# Patient Record
Sex: FEMALE | Race: BLACK OR AFRICAN AMERICAN | NOT HISPANIC OR LATINO | URBAN - METROPOLITAN AREA
[De-identification: names, ages, dates, MRNs, and addresses within clinical notes are randomized per-mention and may not be internally consistent; named-entity substitution may affect disease eponyms.]

---

## 2023-08-31 ENCOUNTER — HOSPITAL ENCOUNTER (OUTPATIENT)
Dept: DATA CONVERSION | Facility: HOSPITAL | Age: 53
Discharge: HOME | End: 2023-08-31
Payer: COMMERCIAL

## 2023-08-31 DIAGNOSIS — E78.5 HYPERLIPIDEMIA, UNSPECIFIED: ICD-10-CM

## 2023-08-31 DIAGNOSIS — Z13.228 ENCOUNTER FOR SCREENING FOR OTHER METABOLIC DISORDERS: ICD-10-CM

## 2023-08-31 DIAGNOSIS — Z00.00 ENCOUNTER FOR GENERAL ADULT MEDICAL EXAMINATION WITHOUT ABNORMAL FINDINGS: ICD-10-CM

## 2023-08-31 DIAGNOSIS — Z13.0 ENCOUNTER FOR SCREENING FOR DISEASES OF THE BLOOD AND BLOOD-FORMING ORGANS AND CERTAIN DISORDERS INVOLVING THE IMMUNE MECHANISM: ICD-10-CM

## 2023-08-31 LAB
ALBUMIN SERPL-MCNC: 4.1 GM/DL (ref 3.5–5)
ALBUMIN/GLOB SERPL: 1.3 RATIO (ref 1.5–3)
ALP BLD-CCNC: 90 U/L (ref 35–125)
ALT SERPL-CCNC: 14 U/L (ref 5–40)
ANION GAP SERPL CALCULATED.3IONS-SCNC: 11 MMOL/L (ref 0–19)
APPEARANCE PLAS: CLEAR
AST SERPL-CCNC: 17 U/L (ref 5–40)
BILIRUB SERPL-MCNC: 0.5 MG/DL (ref 0.1–1.2)
BUN SERPL-MCNC: 19 MG/DL (ref 8–25)
BUN/CREAT SERPL: 17.3 RATIO (ref 8–21)
CALCIUM SERPL-MCNC: 9.9 MG/DL (ref 8.5–10.4)
CHLORIDE SERPL-SCNC: 105 MMOL/L (ref 97–107)
CHOLEST SERPL-MCNC: 230 MG/DL (ref 133–200)
CHOLEST/HDLC SERPL: 3.3 RATIO
CO2 SERPL-SCNC: 27 MMOL/L (ref 24–31)
COLOR SPUN FLD: YELLOW
CREAT SERPL-MCNC: 1.1 MG/DL (ref 0.4–1.6)
DEPRECATED RDW RBC AUTO: 39.7 FL (ref 37–54)
ERYTHROCYTE [DISTWIDTH] IN BLOOD BY AUTOMATED COUNT: 14.2 % (ref 11.7–15)
FASTING STATUS PATIENT QL REPORTED: ABNORMAL
GFR SERPL CREATININE-BSD FRML MDRD: 59 ML/MIN/1.73 M2
GLOBULIN SER-MCNC: 3.1 G/DL (ref 1.9–3.7)
GLUCOSE SERPL-MCNC: 86 MG/DL (ref 65–99)
HCT VFR BLD AUTO: 39.6 % (ref 36–44)
HDLC SERPL-MCNC: 70 MG/DL
HGB BLD-MCNC: 13.9 GM/DL (ref 12–15)
LDLC SERPL CALC-MCNC: 148 MG/DL (ref 65–130)
MCH RBC QN AUTO: 27.1 PG (ref 26–34)
MCHC RBC AUTO-ENTMCNC: 35.1 % (ref 31–37)
MCV RBC AUTO: 77.2 FL (ref 80–100)
NRBC BLD-RTO: 0 /100 WBC
PLATELET # BLD AUTO: 208 K/UL (ref 150–450)
PMV BLD AUTO: 11.8 CU (ref 7–12.6)
POTASSIUM SERPL-SCNC: 4.4 MMOL/L (ref 3.4–5.1)
PROT SERPL-MCNC: 7.2 G/DL (ref 5.9–7.9)
RBC # BLD AUTO: 5.13 M/UL (ref 4–4.9)
SODIUM SERPL-SCNC: 143 MMOL/L (ref 133–145)
TRIGL SERPL-MCNC: 62 MG/DL (ref 40–150)
TSH SERPL DL<=0.05 MIU/L-ACNC: 0.94 MIU/L (ref 0.27–4.2)
WBC # BLD AUTO: 5.8 K/UL (ref 4.5–11)

## 2023-09-06 ENCOUNTER — HOSPITAL ENCOUNTER (OUTPATIENT)
Dept: DATA CONVERSION | Facility: HOSPITAL | Age: 53
End: 2023-09-06

## 2023-09-06 DIAGNOSIS — Z13.6 ENCOUNTER FOR SCREENING FOR CARDIOVASCULAR DISORDERS: ICD-10-CM

## 2023-09-06 DIAGNOSIS — E78.5 HYPERLIPIDEMIA, UNSPECIFIED: ICD-10-CM

## 2023-09-16 VITALS
HEART RATE: 79 BPM | HEIGHT: 62 IN | DIASTOLIC BLOOD PRESSURE: 80 MMHG | SYSTOLIC BLOOD PRESSURE: 120 MMHG | BODY MASS INDEX: 32.72 KG/M2 | TEMPERATURE: 96.4 F | OXYGEN SATURATION: 98 % | WEIGHT: 177.8 LBS

## 2023-10-01 ENCOUNTER — HOSPITAL ENCOUNTER (OUTPATIENT)
Dept: RADIOLOGY | Facility: HOSPITAL | Age: 53
Discharge: HOME | End: 2023-10-01
Payer: COMMERCIAL

## 2023-10-01 DIAGNOSIS — Z13.6 ENCOUNTER FOR SCREENING FOR CARDIOVASCULAR DISORDERS: ICD-10-CM

## 2023-10-01 DIAGNOSIS — E78.5 HYPERLIPIDEMIA, UNSPECIFIED: ICD-10-CM

## 2023-10-01 PROCEDURE — 75571 CT HRT W/O DYE W/CA TEST: CPT

## 2023-10-02 ENCOUNTER — TELEPHONE (OUTPATIENT)
Dept: PRIMARY CARE | Facility: CLINIC | Age: 53
End: 2023-10-02
Payer: COMMERCIAL

## 2023-10-02 DIAGNOSIS — R91.1 LUNG NODULE: Primary | ICD-10-CM

## 2023-10-02 NOTE — TELEPHONE ENCOUNTER
Pt called in for results of her CT scoring, states that she got them In her email and want to know what they mean. Please advise

## 2023-10-03 NOTE — TELEPHONE ENCOUNTER
CT calcium scoring results received and reviewed.  Overall, patient's calcium scoring test was excellent with no significant calcifications or atherosclerotic changes appreciated in her coronary vessels.  Radiology did note a incidental lung nodule which they are recommending she have a repeat chest CT in 6 to 12 months to monitor for any evidence of changes.  We will place future order to have this coordinated.

## 2023-12-12 ENCOUNTER — TELEMEDICINE (OUTPATIENT)
Dept: PRIMARY CARE | Facility: CLINIC | Age: 53
End: 2023-12-12
Payer: COMMERCIAL

## 2023-12-12 DIAGNOSIS — R11.0 NAUSEA: ICD-10-CM

## 2023-12-12 DIAGNOSIS — B34.9 ACUTE VIRAL SYNDROME: Primary | ICD-10-CM

## 2023-12-12 PROBLEM — E78.5 HYPERLIPIDEMIA: Status: ACTIVE | Noted: 2023-12-12

## 2023-12-12 PROCEDURE — 99214 OFFICE O/P EST MOD 30 MIN: CPT | Performed by: FAMILY MEDICINE

## 2023-12-12 RX ORDER — ONDANSETRON 4 MG/1
4 TABLET, FILM COATED ORAL EVERY 8 HOURS PRN
Qty: 21 TABLET | Refills: 0 | Status: SHIPPED | OUTPATIENT
Start: 2023-12-12 | End: 2023-12-19

## 2023-12-12 RX ORDER — ASCORBIC ACID 125 MG
TABLET,CHEWABLE ORAL
COMMUNITY

## 2023-12-12 ASSESSMENT — PAIN SCALES - GENERAL: PAINLEVEL: 7

## 2023-12-12 ASSESSMENT — PATIENT HEALTH QUESTIONNAIRE - PHQ9
SUM OF ALL RESPONSES TO PHQ9 QUESTIONS 1 AND 2: 0
1. LITTLE INTEREST OR PLEASURE IN DOING THINGS: NOT AT ALL
2. FEELING DOWN, DEPRESSED OR HOPELESS: NOT AT ALL

## 2023-12-12 NOTE — PATIENT INSTRUCTIONS
Problem List Items Addressed This Visit             ICD-10-CM    Nausea R11.0    Relevant Medications    ondansetron (Zofran) 4 mg tablet    Acute viral syndrome - Primary B34.9     - Symptoms seem consistent with that of a viral illness likely causing upper respiratory symptoms as well as GI upset  -Will send antinausea medication  -If possible, it is recommended that she take a home COVID-19 test to rule this out  - Recommend supportive care with increased fluid intake to thin secretions, Ibuprofen or Tylenol as needed for pain or fever, and steamy showers/saline nasal rinses to help clear the nasal passages   - You may consider tea with honey or a cinnamon stick as these have natural antiviral and antibiotic properties   - Call if symptoms worsen or do not improve with these treatments         Relevant Medications    ondansetron (Zofran) 4 mg tablet

## 2023-12-12 NOTE — ASSESSMENT & PLAN NOTE
- Symptoms seem consistent with that of a viral illness likely causing upper respiratory symptoms as well as GI upset  -Will send antinausea medication  -If possible, it is recommended that she take a home COVID-19 test to rule this out  - Recommend supportive care with increased fluid intake to thin secretions, Ibuprofen or Tylenol as needed for pain or fever, and steamy showers/saline nasal rinses to help clear the nasal passages   - You may consider tea with honey or a cinnamon stick as these have natural antiviral and antibiotic properties   - Call if symptoms worsen or do not improve with these treatments

## 2023-12-12 NOTE — PROGRESS NOTES
Outpatient Visit Note    Chief Complaint   Patient presents with    Headache     Sx started yesterday. Tried Dayquil and ginger ale with slight relief.    Cough    Diarrhea    Sinus Drainage    GI Upset       With patient's permission, this is a Telemedicine visit with video and audio. The provider and patient participated in this telemedicine encounter.    HPI:  Abigail Warren is a 53 y.o. female who presents to the office via telemedicine encounter secondary to acute congestion with diarrhea and nausea.    She reports 1 to 2 days of persistent and progressive malaise with associated subjective fever/chills, nonproductive cough, nasal/sinus congestion and nausea with diarrhea.  States to been at a social gathering with her family over the weekend to which multiple members are now sick.  States that her sister took a COVID-19 test which was negative.  Has been managing with rest, DayQuil and ginger ale with some relief.  No reported chest pain or difficulty breathing.          Current Medications  Current Outpatient Medications   Medication Instructions    multivit with min-folic acid (Women's Multivitamin Gummies) 120 mcg tablet,chewable as directed Orally    ondansetron (ZOFRAN) 4 mg, oral, Every 8 hours PRN        Allergies  No Known Allergies     History reviewed. No pertinent past medical history.   Past Surgical History:   Procedure Laterality Date    BIOPSY  03/2023     No family history on file.  Social History     Tobacco Use    Smoking status: Never    Smokeless tobacco: Never   Vaping Use    Vaping Use: Never used   Substance Use Topics    Alcohol use: Never    Drug use: Never     Tobacco Use: Low Risk  (12/12/2023)    Patient History     Smoking Tobacco Use: Never     Smokeless Tobacco Use: Never     Passive Exposure: Not on file        ROS  All pertinent positive symptoms are included in the history of present illness.  All other systems have been reviewed and are negative and noncontributory to  this patient's current ailments.    VITAL SIGNS  There were no vitals filed for this visit.   There is no height or weight on file to calculate BMI.   Patient is unable to provide    PHYSICAL EXAM  GENERAL APPEARANCE:  Alert and oriented x 3, Pleasant and cooperative, No acute distress.   LUNGS:  No conversational dyspnea or cough during encounter.   PSYCH:  appropriate mood and affect, no difficulty with speech.   Telemedicine visit, no other exam component done.      Assessment/Plan   Problem List Items Addressed This Visit             ICD-10-CM    Nausea R11.0    Relevant Medications    ondansetron (Zofran) 4 mg tablet    Acute viral syndrome - Primary B34.9     - Symptoms seem consistent with that of a viral illness likely causing upper respiratory symptoms as well as GI upset  -Will send antinausea medication  -If possible, it is recommended that she take a home COVID-19 test to rule this out  - Recommend supportive care with increased fluid intake to thin secretions, Ibuprofen or Tylenol as needed for pain or fever, and steamy showers/saline nasal rinses to help clear the nasal passages   - You may consider tea with honey or a cinnamon stick as these have natural antiviral and antibiotic properties   - Call if symptoms worsen or do not improve with these treatments         Relevant Medications    ondansetron (Zofran) 4 mg tablet

## 2023-12-27 ENCOUNTER — TELEMEDICINE (OUTPATIENT)
Dept: PRIMARY CARE | Facility: CLINIC | Age: 53
End: 2023-12-27
Payer: COMMERCIAL

## 2023-12-27 DIAGNOSIS — K52.9 ACUTE GASTROENTERITIS: Primary | ICD-10-CM

## 2023-12-27 PROCEDURE — 99213 OFFICE O/P EST LOW 20 MIN: CPT | Performed by: NURSE PRACTITIONER

## 2023-12-27 ASSESSMENT — ENCOUNTER SYMPTOMS
DIARRHEA: 1
FEVER: 0
CHILLS: 0

## 2023-12-27 NOTE — PROGRESS NOTES
With patient's permission this is a telemedicine visit with video and audio.  History Of Present Illness  Abigail Warren is a 53 y.o. female who calls in for Diarrhea ([phone] Dr. Santana Patient Diarrhea since Monday).    Diarrhea   The current episode started in the past 7 days. The problem occurs 2 to 4 times per day. The problem has been unchanged. The stool consistency is described as Watery. The patient states that diarrhea awakens her from sleep. Pertinent negatives include no chills or fever. Nothing aggravates the symptoms. There are no known risk factors. She has tried nothing for the symptoms. The treatment provided no relief.     Drinking a lot of water, ate a little bit last night and then had diarrhea.        Past Medical History  She has no past medical history on file.    Medications  Current Outpatient Medications   Medication Instructions    multivit with min-folic acid (Women's Multivitamin Gummies) 120 mcg tablet,chewable as directed Orally        Surgical History  She has a past surgical history that includes biopsy (03/2023).     Social History  She reports that she has never smoked. She has never used smokeless tobacco. She reports that she does not drink alcohol and does not use drugs.    Family History  No family history on file.     Allergies  Patient has no known allergies.    On video:     Appearance: Normal appearance.      Effort: Respiratory effort is normal. Speaking in full sentences. No respiratory distress.     Mood and Affect: Mood normal.         Thought Content: Thought content normal.         Judgment: Judgment normal.      Last Recorded Vitals  There were no vitals taken for this visit.  (Vitals are taken by patient at home, if any reported)    Relevant Results      Assessment/Plan   Abigail was seen today for diarrhea.  Diagnoses and all orders for this visit:  Acute gastroenteritis (Primary)          Elizabeth Light, APRN-CNP   
Female

## 2024-09-05 ASSESSMENT — PROMIS GLOBAL HEALTH SCALE
RATE_GENERAL_HEALTH: VERY GOOD
RATE_AVERAGE_PAIN: 0
RATE_SOCIAL_SATISFACTION: VERY GOOD
RATE_QUALITY_OF_LIFE: VERY GOOD
CARRYOUT_PHYSICAL_ACTIVITIES: COMPLETELY
RATE_MENTAL_HEALTH: VERY GOOD
RATE_PHYSICAL_HEALTH: VERY GOOD
CARRYOUT_SOCIAL_ACTIVITIES: VERY GOOD
EMOTIONAL_PROBLEMS: NEVER

## 2024-09-09 ENCOUNTER — OFFICE VISIT (OUTPATIENT)
Dept: PRIMARY CARE | Facility: CLINIC | Age: 54
End: 2024-09-09
Payer: COMMERCIAL

## 2024-09-09 VITALS
OXYGEN SATURATION: 97 % | DIASTOLIC BLOOD PRESSURE: 78 MMHG | TEMPERATURE: 96.7 F | SYSTOLIC BLOOD PRESSURE: 122 MMHG | WEIGHT: 180 LBS | HEIGHT: 62 IN | HEART RATE: 70 BPM | BODY MASS INDEX: 33.13 KG/M2

## 2024-09-09 DIAGNOSIS — Z12.31 BREAST CANCER SCREENING BY MAMMOGRAM: ICD-10-CM

## 2024-09-09 DIAGNOSIS — E78.5 HYPERLIPIDEMIA, UNSPECIFIED HYPERLIPIDEMIA TYPE: ICD-10-CM

## 2024-09-09 DIAGNOSIS — Z12.11 COLON CANCER SCREENING: ICD-10-CM

## 2024-09-09 DIAGNOSIS — Z00.00 ROUTINE ADULT HEALTH MAINTENANCE: Primary | ICD-10-CM

## 2024-09-09 DIAGNOSIS — Z13.0 SCREENING FOR DEFICIENCY ANEMIA: ICD-10-CM

## 2024-09-09 DIAGNOSIS — Z11.59 NEED FOR HEPATITIS C SCREENING TEST: ICD-10-CM

## 2024-09-09 PROCEDURE — 99396 PREV VISIT EST AGE 40-64: CPT | Performed by: FAMILY MEDICINE

## 2024-09-09 PROCEDURE — 3008F BODY MASS INDEX DOCD: CPT | Performed by: FAMILY MEDICINE

## 2024-09-09 PROCEDURE — 1036F TOBACCO NON-USER: CPT | Performed by: FAMILY MEDICINE

## 2024-09-09 ASSESSMENT — PATIENT HEALTH QUESTIONNAIRE - PHQ9
1. LITTLE INTEREST OR PLEASURE IN DOING THINGS: NOT AT ALL
2. FEELING DOWN, DEPRESSED OR HOPELESS: NOT AT ALL
SUM OF ALL RESPONSES TO PHQ9 QUESTIONS 1 AND 2: 0

## 2024-09-09 ASSESSMENT — PAIN SCALES - GENERAL: PAINLEVEL: 0-NO PAIN

## 2024-09-09 NOTE — PROGRESS NOTES
Outpatient Visit Note    Chief Complaint   Patient presents with    Annual Exam       HPI:  Abigail Warren is a 54 y.o. female who presents to the office for annual well exam.  She was last seen for annual well exam on 8/31/2023 with interval telemedicine visit for acute illness.    Last panel blood work completed on 8/31/2023 including CBC, CMP, lipid panel and TSH.  Blood work was remarkable for persistent hypercholesterolemia with minimal change despite prominent lifestyle modification.  Patient was encouraged to continue with healthy, low-fat diet    Well exam:  Overall, she describes her health as good with no reports of recent illness or hospitalization.  Stated that her diet had dramatically improved with her participating in weight watchers since February resulting in a 25 pound weight loss. In regards to physical activity, she does attempt to walk at least 3 times week and regularly goes to an aerobics class. She denies any significant sleep complaints. She denies issues of chest pain, shortness of breath, headaches, vision/hearing changes, abdominal pain, vomiting, diarrhea, melena, hematochezia, constipation or urinary symptoms.           Preventative Health Maintenance:  In regards to preventative health maintenance, last Tdap received unknown. Flu shot not typically received. In regards to CRC screening, she has not had a colon cancer screening to date. She was originally set for call endoscopy with Dr. Hebert though this was canceled. She was subsequently given Cologuard kit though this was not completed secondary to extensive cost with her previous insurance. She has since started with a new insurance and does not know if her coverage has changed. Patient has received initial Pfizer COVID-19 vaccine in December 2021 with no subsequent doses listed.           She reports an established relationship with OB/GYN who organizes her routine female health maintenance exams, having last stated to have  annual in February 2023. Did have biopsy and D&C completed at that time with no abnormalities appreciated.  Due for mammogram at this time.     Does have a prior history of right wrist pain to which she had cortisone injections with Orthopedics. Denies any persisting concerns following last injection.    Current Medications  No current outpatient medications       Allergies  No Known Allergies     Immunizations  Immunization History   Administered Date(s) Administered    Pfizer Purple Cap SARS-CoV-2 12/13/2021        History reviewed. No pertinent past medical history.   Past Surgical History:   Procedure Laterality Date    BIOPSY  03/2023     Family History   Problem Relation Name Age of Onset    Stroke Mother Arco     Hypertension Father Earnest      Social History     Tobacco Use    Smoking status: Never    Smokeless tobacco: Never   Vaping Use    Vaping status: Never Used   Substance Use Topics    Alcohol use: Never    Drug use: Never       ROS  All pertinent positive symptoms are included in the history of present illness.  All other systems have been reviewed and are negative and noncontributory to this patient's current ailments.    VITAL SIGNS  Vitals:    09/09/24 1448   BP: 122/78   Pulse: 70   Temp: 35.9 °C (96.7 °F)   SpO2: 97%       PHYSICAL EXAM  GENERAL APPEARANCE: alert and oriented, Pleasant and cooperative, No Acute Distress.   HEENT: EOMI, PERRLA, TMs intact and flat bilaterally, patent nares, normal oropharynx, MMM  NECK: no lymphadenopathy, no thyromegaly.   HEART: RRR, normal S1S2, no murmurs, click or rubs.   LUNGS: clear to auscultation bilaterally, no wheezes/rhonchi/rales.   ABDOMEN: soft, non-tender, no organomegaly, no masses palpated, no guarding or rigidity.   EXTREMITIES: no edema, normal ROM  SKIN: normal, no rash, unremarkable.   NEUROLOGIC EXAM: non-focal exam.   MUSCULOSKELETAL: no gross abnormalities.   PSYCH: affect is normal, eye contact is good.     Assessment/Plan   Problem  List Items Addressed This Visit             ICD-10-CM    Hyperlipidemia E78.5     - Will monitor cholesterol levels with blood work ordered today  - Continue to focus on healthy, low-fat diet         Relevant Orders    TSH with reflex to Free T4 if abnormal    Lipid Panel    Comprehensive Metabolic Panel     Other Visit Diagnoses         Codes    Routine adult health maintenance    -  Primary Z00.00    Relevant Orders    TSH with reflex to Free T4 if abnormal    Lipid Panel    Comprehensive Metabolic Panel    CBC    Hepatitis C antibody    BI mammo bilateral screening tomosynthesis    Cologuard® colon cancer screening    Colon cancer screening     Z12.11    Relevant Orders    Cologuard® colon cancer screening    Breast cancer screening by mammogram     Z12.31    Relevant Orders    BI mammo bilateral screening tomosynthesis    Screening for deficiency anemia     Z13.0    Relevant Orders    CBC    Need for hepatitis C screening test     Z11.59    Relevant Orders    Hepatitis C antibody            Additional Visit Plans:  - Complete history and physical examination was performed    GENERAL RECOMMENDATIONS:  - Complete review of history of physical exam completed today  - A healthy diet to maintain a normal BMI (under 25) to reduce heart disease, risk for diabetes encouraged.  - Exercising 150 minutes per week and eating healthy to reduce heart disease.  - Blood pressure screen completed.    BLOOD TESTING:  - Orders for fasting routine blood work given today, to be completed at your earliest convenience  - Will contact you with the blood work results once received and reviewed.    General Recommendations include:  - Cholesterol and diabetes screen if risk factors (overweight, high blood pressure).  - Sexually transmitted infections if risk factors.    VACCINATIONS RECOMMENDATIONS:  - Flu shot annually - advocated seasonally  - Tetanus booster every 10 years - advocated  - Pneumonia vaccination starting at 65 years old  (or earlier if risk factors - smoker, diabetic, heart or lung conditions) -not due yet  - Shingles vaccine for those 50 years or older - check with your insurance for SHINGRIX coverage and get it at your local pharmacy -advocated  -COVID-19 vaccine advocated    SCREENINGS RECOMMENDATIONS:  -Colon cancer screening (with colonoscopy or Cologuard) for men and women starting at age 45 until 74 years old - discussed and advocated    (female)  - Cervical cancer screening (pap test) in women starting at age 21 until age 65 years old -advocated to be completed with female   - Mammogram screening for breast cancer in women starting at 40-50 years and every 1-2 years until age 74 - advocated  - Bone density screening (DEXA) for osteoporosis in women aged 65 years and older (in younger women who are higher risk) - not due yet    Counseling:       Medication education:         Education:  The patient is counseled regarding potential side-effects of all new medications        Understanding:  Patient expressed understanding        Adherence:  No barriers to adherence identified      ** Please excuse any errors in grammar or translation related to this dictation. Voice recognition software was utilized to prepare this document. **

## 2024-09-09 NOTE — LETTER
September 9, 2024     Patient: Abigail Warren   YOB: 1970   Date of Visit: 9/9/2024       To Whom It May Concern:    Abigail Warren was seen in my clinic on 9/9/2024 for her annual well exam.      If you have any questions or concerns, please don't hesitate to call.         Sincerely,         Harry Alva, DO

## 2024-09-09 NOTE — PATIENT INSTRUCTIONS
Problem List Items Addressed This Visit             ICD-10-CM    Hyperlipidemia E78.5     - Will monitor cholesterol levels with blood work ordered today  - Continue to focus on healthy, low-fat diet         Relevant Orders    TSH with reflex to Free T4 if abnormal    Lipid Panel    Comprehensive Metabolic Panel     Other Visit Diagnoses         Codes    Routine adult health maintenance    -  Primary Z00.00    Relevant Orders    TSH with reflex to Free T4 if abnormal    Lipid Panel    Comprehensive Metabolic Panel    CBC    Hepatitis C antibody    BI mammo bilateral screening tomosynthesis    Cologuard® colon cancer screening    Colon cancer screening     Z12.11    Relevant Orders    Cologuard® colon cancer screening    Breast cancer screening by mammogram     Z12.31    Relevant Orders    BI mammo bilateral screening tomosynthesis    Screening for deficiency anemia     Z13.0    Relevant Orders    CBC    Need for hepatitis C screening test     Z11.59    Relevant Orders    Hepatitis C antibody            Additional Visit Plans:  - Complete history and physical examination was performed    GENERAL RECOMMENDATIONS:  - Complete review of history of physical exam completed today  - A healthy diet to maintain a normal BMI (under 25) to reduce heart disease, risk for diabetes encouraged.  - Exercising 150 minutes per week and eating healthy to reduce heart disease.  - Blood pressure screen completed.    BLOOD TESTING:  - Orders for fasting routine blood work given today, to be completed at your earliest convenience  - Will contact you with the blood work results once received and reviewed.    General Recommendations include:  - Cholesterol and diabetes screen if risk factors (overweight, high blood pressure).  - Sexually transmitted infections if risk factors.    VACCINATIONS RECOMMENDATIONS:  - Flu shot annually - advocated seasonally  - Tetanus booster every 10 years - advocated  - Pneumonia vaccination starting at 65  years old (or earlier if risk factors - smoker, diabetic, heart or lung conditions) -not due yet  - Shingles vaccine for those 50 years or older - check with your insurance for SHINGRIX coverage and get it at your local pharmacy -advocated  -COVID-19 vaccine advocated    SCREENINGS RECOMMENDATIONS:  -Colon cancer screening (with colonoscopy or Cologuard) for men and women starting at age 45 until 74 years old - discussed and advocated    (female)  - Cervical cancer screening (pap test) in women starting at age 21 until age 65 years old -advocated to be completed with female   - Mammogram screening for breast cancer in women starting at 40-50 years and every 1-2 years until age 74 - advocated  - Bone density screening (DEXA) for osteoporosis in women aged 65 years and older (in younger women who are higher risk) - not due yet    Counseling:       Medication education:         Education:  The patient is counseled regarding potential side-effects of all new medications        Understanding:  Patient expressed understanding        Adherence:  No barriers to adherence identified      ** Please excuse any errors in grammar or translation related to this dictation. Voice recognition software was utilized to prepare this document. **

## 2024-09-09 NOTE — ASSESSMENT & PLAN NOTE
- Will monitor cholesterol levels with blood work ordered today  - Continue to focus on healthy, low-fat diet

## 2024-10-02 ENCOUNTER — HOSPITAL ENCOUNTER (OUTPATIENT)
Dept: RADIOLOGY | Facility: HOSPITAL | Age: 54
Discharge: HOME | End: 2024-10-02
Payer: COMMERCIAL

## 2024-10-02 VITALS — WEIGHT: 170 LBS | HEIGHT: 61 IN | BODY MASS INDEX: 32.1 KG/M2

## 2024-10-02 DIAGNOSIS — Z00.00 ROUTINE ADULT HEALTH MAINTENANCE: ICD-10-CM

## 2024-10-02 DIAGNOSIS — Z12.31 BREAST CANCER SCREENING BY MAMMOGRAM: ICD-10-CM

## 2024-10-02 DIAGNOSIS — R91.1 LUNG NODULE: ICD-10-CM

## 2024-10-02 PROCEDURE — 71250 CT THORAX DX C-: CPT

## 2024-10-02 PROCEDURE — 77063 BREAST TOMOSYNTHESIS BI: CPT | Performed by: RADIOLOGY

## 2024-10-02 PROCEDURE — 77067 SCR MAMMO BI INCL CAD: CPT

## 2024-10-02 PROCEDURE — 77067 SCR MAMMO BI INCL CAD: CPT | Performed by: RADIOLOGY

## 2024-10-04 ENCOUNTER — TELEPHONE (OUTPATIENT)
Dept: PRIMARY CARE | Facility: CLINIC | Age: 54
End: 2024-10-04
Payer: COMMERCIAL

## 2024-10-04 DIAGNOSIS — E04.1 THYROID NODULE: Primary | ICD-10-CM

## 2024-10-04 NOTE — TELEPHONE ENCOUNTER
Review of chart pt had not done lab work but did have imaging done and an order for an US thyroid was placed. Please advise about thyroid

## 2024-10-04 NOTE — TELEPHONE ENCOUNTER
Stated she can see her lab results in her mychart. I did not see them listed in her labs as of yet. She mentioned that they were done there LW. Please advise and call back as she has concerns over the Thyroid.

## 2024-10-07 ENCOUNTER — E-VISIT (OUTPATIENT)
Dept: PRIMARY CARE | Facility: CLINIC | Age: 54
End: 2024-10-07
Payer: COMMERCIAL

## 2024-10-07 NOTE — TELEPHONE ENCOUNTER
[facility] to review her test results. She mentioned that she is in conference calls all day today. Please call back.

## 2024-10-08 ENCOUNTER — HOSPITAL ENCOUNTER (OUTPATIENT)
Dept: RADIOLOGY | Facility: HOSPITAL | Age: 54
Discharge: HOME | End: 2024-10-08
Payer: COMMERCIAL

## 2024-10-08 ENCOUNTER — LAB (OUTPATIENT)
Dept: LAB | Facility: LAB | Age: 54
End: 2024-10-08
Payer: COMMERCIAL

## 2024-10-08 DIAGNOSIS — Z11.59 NEED FOR HEPATITIS C SCREENING TEST: ICD-10-CM

## 2024-10-08 DIAGNOSIS — Z13.0 SCREENING FOR DEFICIENCY ANEMIA: ICD-10-CM

## 2024-10-08 DIAGNOSIS — E78.5 HYPERLIPIDEMIA, UNSPECIFIED HYPERLIPIDEMIA TYPE: ICD-10-CM

## 2024-10-08 DIAGNOSIS — Z00.00 ROUTINE ADULT HEALTH MAINTENANCE: ICD-10-CM

## 2024-10-08 DIAGNOSIS — E04.1 THYROID NODULE: ICD-10-CM

## 2024-10-08 LAB
ALBUMIN SERPL BCP-MCNC: 4.4 G/DL (ref 3.4–5)
ALP SERPL-CCNC: 70 U/L (ref 33–110)
ALT SERPL W P-5'-P-CCNC: 21 U/L (ref 7–45)
ANION GAP SERPL CALCULATED.3IONS-SCNC: 11 MMOL/L (ref 10–20)
AST SERPL W P-5'-P-CCNC: 31 U/L (ref 9–39)
BILIRUB SERPL-MCNC: 0.8 MG/DL (ref 0–1.2)
BUN SERPL-MCNC: 20 MG/DL (ref 6–23)
CALCIUM SERPL-MCNC: 9.7 MG/DL (ref 8.6–10.3)
CHLORIDE SERPL-SCNC: 104 MMOL/L (ref 98–107)
CHOLEST SERPL-MCNC: 227 MG/DL (ref 0–199)
CHOLEST/HDLC SERPL: 3.2 {RATIO}
CO2 SERPL-SCNC: 28 MMOL/L (ref 21–32)
CREAT SERPL-MCNC: 1.05 MG/DL (ref 0.5–1.05)
EGFRCR SERPLBLD CKD-EPI 2021: 62 ML/MIN/1.73M*2
ERYTHROCYTE [DISTWIDTH] IN BLOOD BY AUTOMATED COUNT: 13.7 % (ref 11.5–14.5)
GLUCOSE SERPL-MCNC: 99 MG/DL (ref 74–99)
HCT VFR BLD AUTO: 39 % (ref 36–46)
HCV AB SER QL: NONREACTIVE
HDLC SERPL-MCNC: 70.9 MG/DL
HGB BLD-MCNC: 14.1 G/DL (ref 12–16)
LDLC SERPL CALC-MCNC: 140 MG/DL
MCH RBC QN AUTO: 27.1 PG (ref 26–34)
MCHC RBC AUTO-ENTMCNC: 36.2 G/DL (ref 32–36)
MCV RBC AUTO: 75 FL (ref 80–100)
NON HDL CHOLESTEROL: 156 MG/DL (ref 0–149)
NRBC BLD-RTO: 0 /100 WBCS (ref 0–0)
PLATELET # BLD AUTO: 201 X10*3/UL (ref 150–450)
POTASSIUM SERPL-SCNC: 4.1 MMOL/L (ref 3.5–5.3)
PROT SERPL-MCNC: 7.4 G/DL (ref 6.4–8.2)
RBC # BLD AUTO: 5.21 X10*6/UL (ref 4–5.2)
SODIUM SERPL-SCNC: 139 MMOL/L (ref 136–145)
TRIGL SERPL-MCNC: 83 MG/DL (ref 0–149)
TSH SERPL-ACNC: 1.57 MIU/L (ref 0.44–3.98)
VLDL: 17 MG/DL (ref 0–40)
WBC # BLD AUTO: 5.5 X10*3/UL (ref 4.4–11.3)

## 2024-10-08 PROCEDURE — 84443 ASSAY THYROID STIM HORMONE: CPT

## 2024-10-08 PROCEDURE — 80053 COMPREHEN METABOLIC PANEL: CPT

## 2024-10-08 PROCEDURE — 76536 US EXAM OF HEAD AND NECK: CPT | Performed by: STUDENT IN AN ORGANIZED HEALTH CARE EDUCATION/TRAINING PROGRAM

## 2024-10-08 PROCEDURE — 86803 HEPATITIS C AB TEST: CPT

## 2024-10-08 PROCEDURE — 36415 COLL VENOUS BLD VENIPUNCTURE: CPT

## 2024-10-08 PROCEDURE — 80061 LIPID PANEL: CPT

## 2024-10-08 PROCEDURE — 85027 COMPLETE CBC AUTOMATED: CPT

## 2024-10-08 PROCEDURE — 76536 US EXAM OF HEAD AND NECK: CPT

## 2024-10-09 DIAGNOSIS — E04.2 MULTIPLE THYROID NODULES: Primary | ICD-10-CM

## 2024-10-14 ENCOUNTER — APPOINTMENT (OUTPATIENT)
Dept: OTOLARYNGOLOGY | Facility: CLINIC | Age: 54
End: 2024-10-14
Payer: COMMERCIAL

## 2024-10-14 VITALS — WEIGHT: 172 LBS | BODY MASS INDEX: 32.5 KG/M2

## 2024-10-14 DIAGNOSIS — E04.2 MULTIPLE THYROID NODULES: ICD-10-CM

## 2024-10-14 PROCEDURE — 1036F TOBACCO NON-USER: CPT | Performed by: GENERAL PRACTICE

## 2024-10-14 PROCEDURE — 99203 OFFICE O/P NEW LOW 30 MIN: CPT | Performed by: GENERAL PRACTICE

## 2024-10-14 RX ORDER — BISMUTH SUBSALICYLATE 262 MG
1 TABLET,CHEWABLE ORAL DAILY
COMMUNITY

## 2024-10-14 NOTE — PROGRESS NOTES
Otolaryngology - Head and Neck Surgery Outpatient New Patient Visit Note        Assessment/Plan:   Problem List Items Addressed This Visit    None  Visit Diagnoses         Codes    Multiple thyroid nodules     E04.2            54yoF with multinodular goiter.   FNA pending this week of dominant nodule.          Follow up:  -plan for follow up in clinic as needed and after completion of ordered studies    All of the above findings, impressions, treatment planning and follow up plans were discussed with the patient who indicated understanding.  the patient was instructed to contact or return to clinic sooner if symptoms/signs persist or worsen despite the above management.      Roberto Heredia MD  Otolaryngology - Head and Neck Surgery            History Of Present Illness  Abigail Warren is a 54 y.o. female presenting for evaluation of thyroid nodules.     Incidentally noted on recent CT chest  No compressive symptoms  No symptoms of hyper/hypothyroid and is chemically euthyroid  Some family history of thyroid issues, but no cancer.  No compressive symptoms.     FNA scheduled for Wednesday of dominant nodule    Left 2.8cm TR3 nodule  Right 1.8cm TR3 nodule.            Past Medical History  She has no past medical history on file.    Surgical History  She has a past surgical history that includes biopsy (03/2023).     Social History  She reports that she has never smoked. She has never used smokeless tobacco. She reports that she does not drink alcohol and does not use drugs.    Family History  Family History   Problem Relation Name Age of Onset    Stroke Mother Nasreen     Hypertension Father Earnest         Allergies  Patient has no known allergies.    Review of Systems  ROS: Pertinent positives as noted in HPI.    - CONSTITUTIONAL: Does not report weight loss, fever or chills.    - HEENT:   Ear: Does not report tinnitus, vertigo, hearing loss, otalgia, otorrhea  Nose: Does not report congestion, rhinorrhea, epistaxis,  decreased smell  Throat: Does not report pain, dysphagia, odynophagia  Larynx: Does not report hoarseness,  difficulty breathing, pain with speaking (odynophonia)  Neck: Does not report new masses, pain, swelling  Face: Does not report sinus pain, pressure, swelling, numbness, weakness     - RESPIRATORY: Does not report SOB or cough.    - CV: Does not report palpitations or chest pain.     - GI: Does not report abdominal pain, nausea, vomiting or diarrhea.    - : Does not report dysuria or urinary frequency.    - MSK: Does not report myalgia or joint pain.    - SKIN: Does not report rash or pruritus.    - NEUROLOGICAL: Does not report headache or syncope.    - PSYCHIATRIC: Does not report recent changes in mood. Does not report anxiety or depression.         Physical Exam:     GENERAL:   Alert & Oriented to person, place and time; Normal affect and appearance. Well developed and well nourished. Conversant & cooperative with examination.     HEAD:   Normocephalic, atraumatic. No sinus tenderness to palpation. Normal parotid bilaterally. Normal facial strength.     NEUROLOGIC:   Cranial nerves II-XII grossly intact, gait WNL. Normal mood and affect.    EYES:   Extraocular movements intact. Pupils equal, round, reactive to light and accommodation. No nystagmus, no ptosis. no scleral injection.    EAR:   Normal auricle. No discomfort or TTP with manipulation.   Handheld otoscopic exam showed normal external auditory canals bilaterally. No purulence or EAC inflammation. Minimal cerumen.   Right tympanic membrane clear and mobile without evidence of perforation, retraction or middle ear effusion.   Left tympanic membrane clear and mobile without evidence of perforation, retraction or middle ear effusion.     NOSE:   No external deformity. No external nasal lesions, lacerations, or scars. Nasal tip symmetrical with normal nasal valves.   Nasal cavity with essentially midline septum, normal mucosa and turbinates. No  lesions, masses, purulence or polyps.     OC/OP:   Mucous membranes moist, no masses, lesions or exudates.   Normal tongue, floor of mouth, teeth, gums, lips. Normal posterior pharyngeal wall.    Normal tonsils without erythema, exudate or obvious calculi     NECK:   No neck masses or thyroid enlargement. Trachea midline. No tenderness to palpation    LYMPHATIC:   No cervical lymphadenopathy.     RESPIRATORY:   Symmetric chest elevation & no retractions. No significant hoarseness. No increased work of breathing.    CV:   No clubbing or cyanosis. No obvious edema    Skin:   No facial rashes, vesicles or lesions.     Extremities:   No gross abnormalities      Clinic Procedure        Information review:  External sources (notes, imaging, lab results) listed below personally reviewed to aid in medical decision making process.  - US thyroid 10/8/24  - CT chest 10/2/24  -

## 2024-10-15 NOTE — PROGRESS NOTES
Subjective   Abigail Warren is a 54 y.o. female is here for evaluation and management of multinodular goitre. The nodule was noted incidentally on CT chest done on 10/3/24 for follow  up of lung nodule. Patient denies any symptoms from the thyroid nodule.  Thyroid ultrasound was done on 10/4/2024 which was suggestive of left thyroid nodule measuring 2.8 cm, TI-RADS 3, right thyroid nodule measuring 1.8 cm, TI-RADS 3.  Patient saw ENT yesterday.  Patient denies change in size of nodule, hoarseness, anterior neck pain, dyspnea, dysphagia, tremor, and palpitations. There is a family history of hyperthyroidism in grandmother but no thyroid cancers. There is no history of radiation to the neck, head or face.     Review of Systems   No fatigue, stable energy  No sleep disturbance   Stable appetite and weight.  No ocular sx- tearing, gritty sensation, itching  No hoarseness, trouble swallowing, or compressive sx   Denies any chest pain, shortness of breath, palpitations.  No changes in bowel habits  No tremors,prox muscle weakness, cramps, tingling.  No heat/cold intol  Has hot flashes, no night  sweats  No nausea/vomit or abdominal pain  Post menopausal, LMP 5 yrs back at age of 49 yrs, Had spotting in 2023, underwent endometrial bx at that time and  was unremarkable  Exposure to radiation- no  Biotin - no  Contrast Exposure- no  Steroid use - no  .    No past medical history on file.   Past Surgical History:   Procedure Laterality Date    BIOPSY  03/2023      Current Outpatient Medications   Medication Instructions    multivitamin tablet 1 tablet, Daily    UNABLE TO FIND 1 tablet, Daily    UNABLE TO FIND 1 capsule, Daily    UNABLE TO FIND 2 tablets, Daily      Physical Exam  [unfilled]   Constitutional: NAD, well groomed. AOx3. Cooperative  Skin/Hair: Warm, dry skin.  HEENT: EOMI, Anicteric scleras, No lid lag or lid retraction, No TTP of ocular globes. Dry oral mucosa. Chvostek sign   Neck: Soft, supple. Non tender,  "full ROM, no lymphadenopathy. Thyroid gland palpation: non nodular, soft consistency, non tender, no bruit.   Cardiovascular: RRR, no rub or murmurs.  Respiratory: CTAB, no accessory muscle use.  Abdomen: Soft, nontender to palpation.  Extremities: Preserved peripheral pulses, No peripheral edema.  Neuro: Moving all extremities spontaneously. CN's grossly intact. No balance or gait disturbances. DTRs: no delay in relaxation phase.    Lab Review  Lab Results   Component Value Date    TSH 1.57 10/08/2024     No results found for: \"FREET4\"    Thyroid US (10/8/24)  RIGHT LOBE:  The right lobe of the thyroid measures 1.7 cm x 2.0 cm x 5.4 cm. The right lobe of the thyroid is heterogenous. Few subcentimeter spongiform nodules.  Nodule 1:  Size: 1.8 x 1.2 x 1.4 cm. TI-RADS category: 3.     LEFT LOBE:  The left lobe of the thyroid measures 2.5 cm x 2.2 cm x 4.9 cm. The left lobe of the thyroid is heterogenous. Few subcentimeter spongiform nodules.  Nodule 1:  Size: 2.8 x 2.0 x 1.6 cm. TI-RADS category: 3     CT chest (10/3/24): 2 cm left thyroid lobe nodule.     Assessment/Plan   Abigail Warren is a 54 y.o. female is here for evaluation and management of multinodular goitre. The nodule was noted incidentally on CT chest done on 10/3/24 for follow  up of lung nodule. Patient denies any symptoms from the thyroid nodule.  Thyroid ultrasound was done on 10/4/2024 which was suggestive of left thyroid nodule measuring 2.8 cm, TI-RADS 3, right thyroid nodule measuring 1.8 cm, TI-RADS 3.  Patient saw ENT yesterday.  Patient denies change in size of nodule, hoarseness, anterior neck pain, dyspnea, dysphagia, tremor, and palpitations. There is a family history of hyperthyroidism in grandmother but no thyroid cancers. There is no history of radiation to the neck, head or face.      Multiple thyroid nodules  -     Referral to Endocrinology  -     CBC and Auto Differential; Future  -     Protime-INR; Future  -     Renal Function Panel; " Future  -     PTH, intact; Future  -     T4, free; Future  -     US guided thyroid biopsy; Future    The risks and benefits of my recommendations, as well as other treatment options were discussed with the patient today. Questions were answered.  Will call patient with results.    The patient was seen and discussed with attending Dr. Aleksandra Webb.     Luis Pickering MD  Endocrinology fellow

## 2024-10-16 ENCOUNTER — APPOINTMENT (OUTPATIENT)
Dept: ENDOCRINOLOGY | Facility: CLINIC | Age: 54
End: 2024-10-16
Payer: COMMERCIAL

## 2024-10-16 VITALS
HEART RATE: 66 BPM | BODY MASS INDEX: 34.39 KG/M2 | SYSTOLIC BLOOD PRESSURE: 117 MMHG | WEIGHT: 182 LBS | DIASTOLIC BLOOD PRESSURE: 77 MMHG

## 2024-10-16 DIAGNOSIS — E04.2 MULTIPLE THYROID NODULES: ICD-10-CM

## 2024-10-16 PROCEDURE — 99204 OFFICE O/P NEW MOD 45 MIN: CPT | Performed by: INTERNAL MEDICINE

## 2024-10-16 ASSESSMENT — PAIN SCALES - GENERAL: PAINLEVEL_OUTOF10: 0-NO PAIN

## 2024-11-11 ENCOUNTER — HOSPITAL ENCOUNTER (OUTPATIENT)
Dept: RADIOLOGY | Facility: HOSPITAL | Age: 54
Discharge: HOME | End: 2024-11-11
Payer: COMMERCIAL

## 2024-11-11 VITALS
RESPIRATION RATE: 16 BRPM | HEIGHT: 62 IN | OXYGEN SATURATION: 98 % | DIASTOLIC BLOOD PRESSURE: 98 MMHG | WEIGHT: 176 LBS | BODY MASS INDEX: 32.39 KG/M2 | HEART RATE: 66 BPM | SYSTOLIC BLOOD PRESSURE: 165 MMHG

## 2024-11-11 DIAGNOSIS — E04.2 MULTIPLE THYROID NODULES: ICD-10-CM

## 2024-11-11 PROCEDURE — 2500000004 HC RX 250 GENERAL PHARMACY W/ HCPCS (ALT 636 FOR OP/ED): Performed by: RADIOLOGY

## 2024-11-11 PROCEDURE — 10005 FNA BX W/US GDN 1ST LES: CPT

## 2024-11-11 PROCEDURE — 88112 CYTOPATH CELL ENHANCE TECH: CPT | Mod: TC | Performed by: INTERNAL MEDICINE

## 2024-11-11 PROCEDURE — 10021 FNA BX W/O IMG GDN 1ST LES: CPT | Performed by: RADIOLOGY

## 2024-11-11 PROCEDURE — 7100000010 HC PHASE TWO TIME - EACH INCREMENTAL 1 MINUTE

## 2024-11-11 PROCEDURE — 7100000009 HC PHASE TWO TIME - INITIAL BASE CHARGE

## 2024-11-11 PROCEDURE — 76536 US EXAM OF HEAD AND NECK: CPT | Performed by: RADIOLOGY

## 2024-11-11 PROCEDURE — 76942 ECHO GUIDE FOR BIOPSY: CPT

## 2024-11-11 RX ORDER — LIDOCAINE HYDROCHLORIDE 10 MG/ML
INJECTION, SOLUTION EPIDURAL; INFILTRATION; INTRACAUDAL; PERINEURAL
Status: COMPLETED | OUTPATIENT
Start: 2024-11-11 | End: 2024-11-11

## 2024-11-11 ASSESSMENT — PAIN SCALES - GENERAL
PAINLEVEL_OUTOF10: 0 - NO PAIN

## 2024-11-11 NOTE — DISCHARGE INSTRUCTIONS
Patient and Family Education    What is a Biopsy or Aspiration?  A biopsy or aspiration is used to help doctors diagnose disease. Small pieces of tissue or cells  are taken from the area using a special kind of needle. The tissue is sent to the lab to be looked  at under a microscope. The procedure can take up to 1 hour.  Before the Test:  ? If you take blood thinning medications, you Must ask your doctor when you should stop  taking them. You may need to stop taking the medicine up to seven (7) days prior to the  test.  ? Do Not Drink or Eat Anything after midnight the day before the test. You may take  medications with a small amount of clear liquid.  ? If you take daily oral diabetic medications, contact your Radiologist or your Radiology  Nurse to determine if you should take your medicine before the test or adjust the dosage.  ? Make plans for a ride home if you are an outpatient.  ? If you have an allergy to iodine or iodinated contrast, your doctor may prescribe special  pills to take before the test.  During the Test:  ? You will lie on a padded X-Ray table.  ? You may be given medicine that will make you drowsy.  ? You will also be given a local anesthetic to numb the biopsy site.  ? You will feel pressure during the biopsy.  After the Test:  ? You will remain on bed rest for 1 to 4 hours.  ? Your blood pressure, pulse and biopsy site will be checked often.  ? If a large sample of tissue needs to be taken, you may be admitted to the hospital for  observation.  Following these instructions for a safer recovery:     Page 2 of 2  Activity:  ? Limit your activity for 24 hours after the test.  ? Do not drive for 24 hours.  ? Do not do any heavy lifting, such as groceries, for 24 hours.  ? Avoid intense exercise and contact sports for 24 hours.  Diet:  ? You may resume your normal diet.  Medicines:  ? If you take medications to thin your blood, ask your doctor when you should start taking  them after the test.  ?  You may take your other medicines as ordered by your doctor.  Call your Doctor if you have:  ? Redness, swelling or pus-like drainage at the biopsy site.  ? Temperature of 100.4 F degrees or higher.  ? Increased pain or tenderness at the biopsy site.  ? Any questions.  Call your Doctor Right Away if you have any of the Signs:  ? Increase in pain in the biopsy site.  ? Dizziness or fainting.  ? Shortness of breath or trouble breathing.  ? Bleeding from the biopsy site.  If you are not able to contact your doctor, call 911 and/or go to the nearest hospital.     How to Reach your Doctor:  Call Dr. Webb at [phone] with problems or questions.

## 2024-11-12 LAB
LABORATORY COMMENT REPORT: NORMAL
LABORATORY COMMENT REPORT: NORMAL
PATH REPORT.FINAL DX SPEC: NORMAL
PATH REPORT.GROSS SPEC: NORMAL
PATH REPORT.TOTAL CANCER: NORMAL

## 2024-11-14 DIAGNOSIS — E04.2 MULTIPLE THYROID NODULES: Primary | ICD-10-CM

## 2024-11-18 DIAGNOSIS — E04.2 MULTIPLE THYROID NODULES: Primary | ICD-10-CM

## 2024-11-18 LAB
TEST COMMENT - SURGICAL SENDOUT REQUEST: NORMAL
TEST COMMENT - SURGICAL SENDOUT REQUEST: NORMAL

## 2024-11-27 ENCOUNTER — E-VISIT (OUTPATIENT)
Dept: PRIMARY CARE | Facility: CLINIC | Age: 54
End: 2024-11-27
Payer: COMMERCIAL

## 2024-11-27 DIAGNOSIS — E04.1 THYROID NODULE: Primary | ICD-10-CM

## 2024-12-03 ENCOUNTER — TELEPHONE (OUTPATIENT)
Dept: PRIMARY CARE | Facility: CLINIC | Age: 54
End: 2024-12-03
Payer: COMMERCIAL

## 2024-12-03 NOTE — TELEPHONE ENCOUNTER
Review of chart shows biopsy was ordered by aryan Webb MD. Pt made aware of what I saw in her chart in imaging. Pt states she had gotten the biopsy done because it was suggested by Dr Mcdonald. She also sent [employer] message

## 2024-12-03 NOTE — TELEPHONE ENCOUNTER
Pt is calling to follow up on her Biopsy Results, she does not know who to call, she did call Oncology in [place] and they didn't know what she was talking about. Please advise and call [phone]

## 2024-12-20 ENCOUNTER — TELEPHONE (OUTPATIENT)
Dept: ENDOCRINOLOGY | Facility: HOSPITAL | Age: 54
End: 2024-12-20
Payer: COMMERCIAL

## 2024-12-20 LAB
AP SUMMARY REPORT: NORMAL
SCAN RESULT: NORMAL

## 2024-12-20 NOTE — TELEPHONE ENCOUNTER
Called patient to discuss  thyroid pathology and molecular results. Informed that there is < 1% risk of malignancy.    Recommendations:  Follow up in clinic in 9 months or sooner if she has any symptoms like difficulty swallowing, SOB, hoarseness.    Pt understood and gave appropriate teach back about the plan of care. All questions  were answered to the patient's satisfaction. The patient is instructed to contact us at any time if questions or problems arise.     The patient was discussed with attending Dr. Tracey Pickering MD  Endocrinology fellow

## 2024-12-27 ENCOUNTER — E-VISIT (OUTPATIENT)
Dept: PRIMARY CARE | Facility: CLINIC | Age: 54
End: 2024-12-27
Payer: COMMERCIAL

## 2024-12-27 DIAGNOSIS — R11.0 NAUSEA: Primary | ICD-10-CM
